# Patient Record
Sex: MALE | URBAN - METROPOLITAN AREA
[De-identification: names, ages, dates, MRNs, and addresses within clinical notes are randomized per-mention and may not be internally consistent; named-entity substitution may affect disease eponyms.]

---

## 2017-05-01 ENCOUNTER — OUTPATIENT (OUTPATIENT)
Dept: OUTPATIENT SERVICES | Facility: HOSPITAL | Age: 67
LOS: 1 days | End: 2017-05-01
Payer: MEDICAID

## 2017-05-23 DIAGNOSIS — R69 ILLNESS, UNSPECIFIED: ICD-10-CM

## 2017-06-01 PROCEDURE — G9001: CPT

## 2018-11-28 ENCOUNTER — HOSPITAL ENCOUNTER (INPATIENT)
Dept: HOSPITAL 74 - YASAS | Age: 68
LOS: 5 days | Discharge: HOME | DRG: 897 | End: 2018-12-03
Attending: INTERNAL MEDICINE | Admitting: INTERNAL MEDICINE
Payer: COMMERCIAL

## 2018-11-28 VITALS — BODY MASS INDEX: 25 KG/M2

## 2018-11-28 DIAGNOSIS — F17.213: ICD-10-CM

## 2018-11-28 DIAGNOSIS — R56.9: ICD-10-CM

## 2018-11-28 DIAGNOSIS — R73.9: ICD-10-CM

## 2018-11-28 DIAGNOSIS — F10.230: Primary | ICD-10-CM

## 2018-11-28 DIAGNOSIS — I10: ICD-10-CM

## 2018-11-28 DIAGNOSIS — D69.6: ICD-10-CM

## 2018-11-28 PROCEDURE — HZ2ZZZZ DETOXIFICATION SERVICES FOR SUBSTANCE ABUSE TREATMENT: ICD-10-PCS | Performed by: INTERNAL MEDICINE

## 2018-11-28 NOTE — HP
CIWA Score


Nausea/Vomiting: 3


Muscle Tremors: 4-Moderate,w/Arms Extend


Anxiety: 3


Agitation: 3


Paroxysmal Sweats: 2


Orientation: 1-Uncertain about Date


Tacttile Disturbances: 1-Very Mild Itch/Numbness


Auditory Disturbances: 0-None


Visual Disturbances: 0-None


Headache: 3-Moderate


CIWA-Ar Total Score: 20





- Admission Criteria


[facility] Guidelines: Admission for Medically Managed Detox: 


Requires at least one of the followin. CIWA greater than 12


2. Seizures within the past 24 hours


3. Delirium tremens within the past 24 hours


4. Hallucinations within the past 24 hours


5. Acute intervention needed for co  occurring medical disorder


6. Acute intervention needed for co  occurring psychiatric disorder


7. Severe withdrawal that cannot be handled at a lower level of care (continued


    vomiting, continued diarrhea, abnormal vital signs) requiring intravenous


    medication and/or fluids


8. Pregnancy








Admission ROS S





- HPI


Chief Complaint: 





Alcohol withdrawal symptoms


Allergies/Adverse Reactions: 


 Allergies











Allergy/AdvReac Type Severity Reaction Status Date / Time


 


No Known Allergies Allergy   Verified 18 21:41











History of Present Illness: 





67 years old male with a long history of alcohol dependence is seeking 

admission to detox. Patient has has been in previous detox at [facility]  and reports 9 years  of sobriety.  He has 

medical history of hypertension. He denies suicide attempt and suicidal 

ideation at this time.


Exam Limitations: No Limitations





- Ebola screening


Have you traveled outside of the country in the last 21 days: No


Have you had contact with anyone from an Ebola affected area: No


Have you been sick,other than usual withdrawal symptoms: No


Do you have a fever: No





- Review of Systems


Constitutional: Chills, Malaise, Night Sweats, Changes in sleep, Weakness


EENT: reports: Cataracts


Respiratory: reports: No Symptoms reported


Cardiac: reports: No Symptoms Reported


GI: reports: Constipated, Poor Appetite, Poor Fluid Intake, Vomiting, Abdominal 

cramping


: reports: No Symptoms Reported


Musculoskeletal: reports: Back Pain, Muscle Pain


Integumentary: reports: Dryness


Neuro: reports: Headache, Tingling, Tremors


Endocrine: reports: No Symptoms Reported


Hematology: reports: No Symptoms Reported


Psychiatric: reports: Mood/Affect Appropiate, Orientated x3


Other Systems: Reviewed and Negative





Patient History





- Patient Medical History


Hx Anemia: No


Hx Asthma: No


Hx Chronic Obstructive Pulmonary Disease (COPD): No


Hx Cancer: No


Hx Cardiac Disorders: No


Hx Congestive Heart Failure: No


Hx Hypertension: Yes (Metoprolol, Amlodipine)


Hx Hypercholesterolemia: No


Hx Pacemaker: No


HX Cerebrovascular Accident: No


Hx Seizures: Yes (Not on medication)


Hx Dementia: No


Hx Diabetes: No


Hx Gastrointestinal Disorders: No


Hx Liver Disease: No


Hx Genitourinary Disorders: No


Hx Sexually Transmitted Disorders: No


Hx Renal Disease (ESRD): No


Hx Thyroid Disease: No


Hx Human Immunodeficiency Virus (HIV): No


Hx Hepatitis C: No


Hx Depression: No


Hx Suicide Attempt: No (Denies suicidal ideation at this time)


Hx Bipolar Disorder: No


Hx Schizophrenia: No





- Patient Surgical History


Past Surgical History: No





- PPD History


Previous Implant?: No (Male)


Documented Results: Negative w/o proof


Implanted On Prior [facility] Admission?: No


PPD to be Administered?: Yes





- Reproductive History


Patient is a Female of Child Bearing Age (11 -55 yrs old): No (Male)





- Smoking Cessation


Smoking history: Current every day smoker


Have you smoked in the past 12 months: Yes


Aproximately how many cigarettes per day: 15


Cigars Per Day: 15


Hx Chewing Tobacco Use: Yes


Initiated information on smoking cessation: Yes


'Breaking Loose' booklet given: 18





- Substance & Tx. History


Hx Alcohol Use: Yes


Hx Substance Use: No


Substance Use Type: None


Hx Substance Use Treatment: No





- Substances Abused


  ** Alcohol


Route: Oral


Frequency: Daily


Amount used: RUM- 1FIFTH, BEER - 6 PACK


Age of first use: 10


Date of Last Use: 18





Family Disease History





- Family Disease History


Family Disease History: Heart Disease: Mother, Other: Son (Hweart attack - 

)





Admission Physical Exam [facility]





- Vital Signs


Vital Signs: 


 Vital Signs - 24 hr











  18





  20:36


 


Temperature 98.3 F


 


Pulse Rate 115 H


 


Respiratory 18





Rate 


 


Blood Pressure 160/90














- Physical


General Appearance: Yes: Appropriately Dressed


HEENTM: Yes: EOMI, Normal ENT Inspection, Normocephalic, Normal Voice, DAMIAN


Respiratory: Yes: Lungs Clear, Normal Breath Sounds, No Respiratory Distress


Neck: Yes: Supple


Breast: Yes: Breast Exam Deferred


Cardiology: Yes: Regular Rhythm, Regular Rate


Abdominal: Yes: Normal Bowel Sounds


Back: Yes: Normal Inspection


Musculoskeletal: Yes: Within Normal Limits


Extremities: Yes: Tremors, Pedal Edema, Swelling


Neurological: Yes: CNs II-XII NML intact, Motor Strength 5/5, Normal Mood/Affect

, Normal Response


Integumentary: Yes: Within Normal Limits


Lymphatic: Yes: Within Normal Limits





- Diagnostic


(1) Nicotine dependence


Current Visit: Yes   Status: Chronic   





(2) Alcohol dependence with uncomplicated withdrawal


Current Visit: Yes   Status: Chronic   





Cleared for Admission [facility]





- Detox or Rehab


[facility] Level of Care: Medically Managed


Detox Regimen/Protocol: Librium





[facility] Breath Alcohol Content


Breath Alcohol Content: 0.305





Urine Drug Screen





- Results


Drug Screen Negative: Yes

## 2018-11-29 LAB
ALBUMIN SERPL-MCNC: 3.4 G/DL (ref 3.4–5)
ALP SERPL-CCNC: 98 U/L (ref 45–117)
ALT SERPL-CCNC: 75 U/L (ref 13–61)
ANION GAP SERPL CALC-SCNC: 13 MMOL/L (ref 8–16)
AST SERPL-CCNC: 121 U/L (ref 15–37)
BILIRUB SERPL-MCNC: 0.8 MG/DL (ref 0.2–1)
BUN SERPL-MCNC: 8 MG/DL (ref 7–18)
CALCIUM SERPL-MCNC: 9 MG/DL (ref 8.5–10.1)
CHLORIDE SERPL-SCNC: 104 MMOL/L (ref 98–107)
CO2 SERPL-SCNC: 22 MMOL/L (ref 21–32)
CREAT SERPL-MCNC: 0.6 MG/DL (ref 0.55–1.3)
DEPRECATED RDW RBC AUTO: 13.8 % (ref 11.9–15.9)
GLUCOSE SERPL-MCNC: 131 MG/DL (ref 74–106)
HCT VFR BLD CALC: 44.6 % (ref 35.4–49)
HGB BLD-MCNC: 14.7 GM/DL (ref 11.7–16.9)
MCH RBC QN AUTO: 35.2 PG (ref 25.7–33.7)
MCHC RBC AUTO-ENTMCNC: 32.9 G/DL (ref 32–35.9)
MCV RBC: 106.9 FL (ref 80–96)
PLATELET # BLD AUTO: 113 K/MM3 (ref 134–434)
PMV BLD: 9.5 FL (ref 7.5–11.1)
POTASSIUM SERPLBLD-SCNC: 3.6 MMOL/L (ref 3.5–5.1)
PROT SERPL-MCNC: 7.9 G/DL (ref 6.4–8.2)
RBC # BLD AUTO: 4.17 M/MM3 (ref 4–5.6)
SODIUM SERPL-SCNC: 140 MMOL/L (ref 136–145)
WBC # BLD AUTO: 4.1 K/MM3 (ref 4–10)

## 2018-11-29 RX ADMIN — NICOTINE SCH MG: 14 PATCH, EXTENDED RELEASE TRANSDERMAL at 10:30

## 2018-11-29 RX ADMIN — AMLODIPINE BESYLATE SCH MG: 10 TABLET ORAL at 17:20

## 2018-11-29 RX ADMIN — Medication SCH MG: at 22:39

## 2018-11-29 RX ADMIN — LOSARTAN POTASSIUM SCH MG: 50 TABLET, FILM COATED ORAL at 10:30

## 2018-11-29 RX ADMIN — Medication PRN MG: at 00:24

## 2018-11-29 RX ADMIN — Medication SCH TAB: at 10:30

## 2018-11-29 RX ADMIN — Medication SCH: at 00:23

## 2018-11-29 NOTE — PN
[facility] CIWA





- CIWA Score


Nausea/Vomitin


Muscle Tremors: 4-Moderate,w/Arms Extend


Anxiety: 4-Mod. Anxious/Guarded


Agitation: 4-Moderately Restless


Paroxysmal Sweats: 3


Orientation: 0-Oriented


Tacttile Disturbances: 0-None


Auditory Disturbances: 0-None


Visual Disturbances: 0-None


Headache: 0-None Present


CIWA-Ar Total Score: 17





[facility] Progress Note (SOAP)


Subjective: 





Sweating, tremor, dizziness, anxious


Objective: 





18 10:44


 Last Vital Signs











Temp Pulse Resp BP Pulse Ox


 


 98.3 F   108 H  20   171/96 H   


 


 18 09:35  18 09:35  18 09:35  18 09:35   








Elevated b/p noted (h/o htn, takes med at home, will resume hypertensive 

medications)





Admission labs in progress


Assessment: 





18 10:45


Withdrawal symptoms


Plan: 





Continue detox


Encouraged PO water intake


Follow up on admission labs





HTN: resume metoprolol 25mg PO daily, losartan 50mg PO daily and norvasc 10mg 

PO daily (norvasc ordered for daily at 6pm)

## 2018-11-30 RX ADMIN — LOSARTAN POTASSIUM SCH MG: 50 TABLET, FILM COATED ORAL at 10:48

## 2018-11-30 RX ADMIN — NICOTINE SCH: 14 PATCH, EXTENDED RELEASE TRANSDERMAL at 10:48

## 2018-11-30 RX ADMIN — AMLODIPINE BESYLATE SCH MG: 10 TABLET ORAL at 18:38

## 2018-11-30 RX ADMIN — Medication SCH MG: at 22:23

## 2018-11-30 RX ADMIN — Medication SCH TAB: at 10:48

## 2018-11-30 NOTE — PN
[facility] CIWA





- CIWA Score


Nausea/Vomitin


Muscle Tremors: 3


Anxiety: 3


Agitation: 3


Paroxysmal Sweats: 3


Orientation: 0-Oriented


Tacttile Disturbances: 0-None


Auditory Disturbances: 0-None


Visual Disturbances: 0-None


Headache: 1-Very Mild


CIWA-Ar Total Score: 15





BHS Progress Note (SOAP)


Subjective: 





Interrupted sleep, restlessness, legs hurt, anxious


Objective: 





18 13:59


 Last Vital Signs











Temp Pulse Resp BP Pulse Ox


 


 97.6 F   84   16   152/86    


 


 18 10:14  18 10:14  18 10:14  18 10:14   








 Laboratory Tests











  18





  07:00 07:00 07:00


 


WBC  4.1  


 


RBC  4.17  


 


Hgb  14.7  


 


Hct  44.6  


 


MCV  106.9 H  


 


MCH  35.2 H  


 


MCHC  32.9  


 


RDW  13.8  


 


Plt Count  113 L  


 


MPV  9.5  


 


Sodium   140 


 


Potassium   3.6 


 


Chloride   104 


 


Carbon Dioxide   22 


 


Anion Gap   13 


 


BUN   8 


 


Creatinine   0.6 


 


Creat Clearance w eGFR   > 60 


 


Random Glucose   131 H 


 


Calcium   9.0 


 


Total Bilirubin   0.8 


 


AST   121 H 


 


ALT   75 H 


 


Alkaline Phosphatase   98 


 


Total Protein   7.9 


 


Albumin   3.4 


 


RPR Titer    Nonreactive








Labs reviewed: glucose 131, plt 113


Assessment: 





18 14:01


Withdrawal symptoms





Noted with hyperglycemia and thrombocytopenia


Plan: 





Continue detox





Hyperglycemia: encouraged PO water intake, repeat fasting glucose





Thrombocytopenia: asymptomatic, probably r/t alcohol dependence, follow up with 

PCP for monitoring

## 2018-12-01 LAB
APPEARANCE UR: CLEAR
BILIRUB UR STRIP.AUTO-MCNC: NEGATIVE MG/DL
COLOR UR: YELLOW
KETONES UR QL STRIP: NEGATIVE
LEUKOCYTE ESTERASE UR QL STRIP.AUTO: NEGATIVE
NITRITE UR QL STRIP: NEGATIVE
PH UR: 7 [PH] (ref 5–8)
PROT UR QL STRIP: NEGATIVE
PROT UR QL STRIP: NEGATIVE
SP GR UR: 1.01 (ref 1.01–1.03)
UROBILINOGEN UR STRIP-MCNC: (no result) MG/DL (ref 0.2–1)

## 2018-12-01 RX ADMIN — AMLODIPINE BESYLATE SCH MG: 10 TABLET ORAL at 17:14

## 2018-12-01 RX ADMIN — LOSARTAN POTASSIUM SCH MG: 50 TABLET, FILM COATED ORAL at 10:45

## 2018-12-01 RX ADMIN — Medication SCH TAB: at 10:44

## 2018-12-01 RX ADMIN — Medication SCH MG: at 22:23

## 2018-12-01 RX ADMIN — NICOTINE SCH MG: 14 PATCH, EXTENDED RELEASE TRANSDERMAL at 10:45

## 2018-12-01 RX ADMIN — Medication PRN MG: at 22:23

## 2018-12-01 NOTE — PN
[facility] Progress Note (SOAP)


Subjective: 





PT IS OOB AMBULATING BUT WITH SLIGHTLY UNSTABLE GAIT, TRACE OF SHAKES. ALERT O 

X 3. PT REPORTS HE FEELS LESS SHAKY NOW THAN WHEN HE FIRST CAME INTO DETOX.


Objective: 





12/01/18 12:41


 Vital Signs











  12/01/18 12/01/18





  06:33 09:49


 


Temperature 97.3 F L 97.5 F L


 


Pulse Rate 78 88


 


Respiratory 18 18





Rate  


 


Blood Pressure 143/88 144/92








 Laboratory Tests











  11/29/18 11/29/18 11/29/18





  07:00 07:00 07:00


 


WBC  4.1  


 


RBC  4.17  


 


Hgb  14.7  


 


Hct  44.6  


 


MCV  106.9 H  


 


MCH  35.2 H  


 


MCHC  32.9  


 


RDW  13.8  


 


Plt Count  113 L  


 


MPV  9.5  


 


Sodium   140 


 


Potassium   3.6 


 


Chloride   104 


 


Carbon Dioxide   22 


 


Anion Gap   13 


 


BUN   8 


 


Creatinine   0.6 


 


Creat Clearance w eGFR   > 60 


 


Random Glucose   131 H 


 


Calcium   9.0 


 


Total Bilirubin   0.8 


 


AST   121 H 


 


ALT   75 H 


 


Alkaline Phosphatase   98 


 


Total Protein   7.9 


 


Albumin   3.4 


 


RPR Titer    Nonreactive











Assessment: 





12/01/18 12:45


DECREASED WITHDRAWAL SX


Plan: 





CONTINUE DETOX/MONITOR FOR SAFETY.

## 2018-12-02 RX ADMIN — Medication SCH TAB: at 10:35

## 2018-12-02 RX ADMIN — NICOTINE SCH MG: 14 PATCH, EXTENDED RELEASE TRANSDERMAL at 10:35

## 2018-12-02 RX ADMIN — Medication SCH MG: at 22:15

## 2018-12-02 RX ADMIN — AMLODIPINE BESYLATE SCH MG: 10 TABLET ORAL at 17:25

## 2018-12-02 RX ADMIN — LOSARTAN POTASSIUM SCH MG: 50 TABLET, FILM COATED ORAL at 10:35

## 2018-12-02 NOTE — PN
BHS Progress Note (SOAP)


Subjective: 





Tremor, interrupted sleep


Objective: 





12/02/18 16:08


 Last Vital Signs











Temp Pulse Resp BP Pulse Ox


 


 97.2 F L  86   18   139/83    


 


 12/02/18 13:27  12/02/18 13:27  12/02/18 13:27  12/02/18 13:27   








 Laboratory Tests











  11/29/18 11/29/18 11/29/18





  07:00 07:00 07:00


 


WBC  4.1  


 


RBC  4.17  


 


Hgb  14.7  


 


Hct  44.6  


 


MCV  106.9 H  


 


MCH  35.2 H  


 


MCHC  32.9  


 


RDW  13.8  


 


Plt Count  113 L  


 


MPV  9.5  


 


Sodium   140 


 


Potassium   3.6 


 


Chloride   104 


 


Carbon Dioxide   22 


 


Anion Gap   13 


 


BUN   8 


 


Creatinine   0.6 


 


Creat Clearance w eGFR   > 60 


 


Random Glucose   131 H 


 


Calcium   9.0 


 


Total Bilirubin   0.8 


 


AST   121 H 


 


ALT   75 H 


 


Alkaline Phosphatase   98 


 


Total Protein   7.9 


 


Albumin   3.4 


 


Urine Color   


 


Urine Appearance   


 


Urine pH   


 


Ur Specific Gravity   


 


Urine Protein   


 


Urine Glucose (UA)   


 


Urine Ketones   


 


Urine Blood   


 


Urine Nitrite   


 


Urine Bilirubin   


 


Urine Urobilinogen   


 


Ur Leukocyte Esterase   


 


RPR Titer    Nonreactive














  12/01/18





  09:37


 


WBC 


 


RBC 


 


Hgb 


 


Hct 


 


MCV 


 


MCH 


 


MCHC 


 


RDW 


 


Plt Count 


 


MPV 


 


Sodium 


 


Potassium 


 


Chloride 


 


Carbon Dioxide 


 


Anion Gap 


 


BUN 


 


Creatinine 


 


Creat Clearance w eGFR 


 


Random Glucose 


 


Calcium 


 


Total Bilirubin 


 


AST 


 


ALT 


 


Alkaline Phosphatase 


 


Total Protein 


 


Albumin 


 


Urine Color  Yellow


 


Urine Appearance  Clear


 


Urine pH  7.0


 


Ur Specific [place]  1.011


 


Urine Protein  Negative


 


Urine Glucose (UA)  Negative


 


Urine Ketones  Negative


 


Urine Blood  Negative


 


Urine Nitrite  Negative


 


Urine Bilirubin  Negative


 


Urine Urobilinogen  4.0 e.u/dl


 


Ur Leukocyte Esterase  Negative


 


RPR Titer 








Labs reviewed: glucose 131


Assessment: 





12/02/18 16:08


Withdrawal symptoms





Hyperglycemia noted


Plan: 





Continue detox


Encouraged PO water intake





Hyperglycemia: repeat fasting glucose





Patient for discharge tomorrow and will need transportation. As per staff, 

patient not eligible for transportation via his insurance and patient lives in 

[place].

## 2018-12-03 VITALS — HEART RATE: 86 BPM | SYSTOLIC BLOOD PRESSURE: 135 MMHG | DIASTOLIC BLOOD PRESSURE: 83 MMHG | TEMPERATURE: 97.5 F

## 2018-12-03 RX ADMIN — LOSARTAN POTASSIUM SCH MG: 50 TABLET, FILM COATED ORAL at 10:29

## 2018-12-03 RX ADMIN — Medication SCH TAB: at 10:29

## 2018-12-03 RX ADMIN — NICOTINE SCH: 14 PATCH, EXTENDED RELEASE TRANSDERMAL at 10:30

## 2018-12-03 NOTE — DS
[facility] Detox Discharge Summary


Admission Date: 


11/28/18





Discharge Date: 12/03/18





- History


Additional Comments: 





pt being discharged home today.


Presently still on the unit awaiting transportation back home


Pt will do aftercare by attending AA meetings.


States he has a good support person that lives 4 houses from him who he intends 

to be constantly liaising with as a supplemental help to his sobriety.


Declined med refill, states "I still have a lot at home"





 Vital Signs











Temperature  97.5 F L  12/03/18 09:30


 


Pulse Rate  86   12/03/18 09:30


 


Respiratory Rate  18   12/03/18 09:30


 


Blood Pressure  135/83   12/03/18 09:30


 


O2 Sat by Pulse Oximetry (%)      














Pertinent Past History: 





HTN


Hyperglycemia


Thrombocytopenia


Seizure





- Physical Exam Results


Vital Signs: 


 Vital Signs











Temperature  97.5 F L  12/03/18 09:30


 


Pulse Rate  86   12/03/18 09:30


 


Respiratory Rate  18   12/03/18 09:30


 


Blood Pressure  135/83   12/03/18 09:30


 


O2 Sat by Pulse Oximetry (%)      











Pertinent Admission Physical Exam Findings: 





withdrawal sx





- Treatment


Hospital Course: Detox Protocol Followed, Detoxed Safely, Responded well, 

Discharged Condition Good


Patient has Accepted a Rehab Referral to: AA meetings





- Medication


Discharge Medications: 


Ambulatory Orders





Amlodipine Besylate 10 mg PO DAILY 11/28/18 


Losartan Potassium 50 mg PO DAILY 11/28/18 


Metoprolol Succinate 25 mg PO DAILY 11/28/18 











- Diagnosis


(1) Alcohol dependence with uncomplicated withdrawal


Current Visit: Yes   Status: Acute   





(2) Hyperglycemia


Current Visit: Yes   Status: Acute   





(3) Hypertension


Current Visit: Yes   Status: Chronic   


Qualifiers: 


   Hypertension type: essential hypertension   Qualified Code(s): I10 - 

Essential (primary) hypertension   





(4) Nicotine dependence


Current Visit: Yes   Status: Chronic   


Qualifiers: 


   Nicotine product type: cigarettes   Substance use status: in withdrawal   

Qualified Code(s): F17.213 - Nicotine dependence, cigarettes, with withdrawal   





(5) Thrombocytopenia


Current Visit: Yes   Status: Chronic   





(6) Seizure


Current Visit: Yes   Status: Suspected

## 2018-12-03 NOTE — PN
BHS Progress Note (SOAP)


Subjective: 





denies any complaints


Objective: 





12/03/18 11:20


 A & O x 3


able to ambulate





 Vital Signs











Temperature  97.5 F L  12/03/18 09:30


 


Pulse Rate  86   12/03/18 09:30


 


Respiratory Rate  18   12/03/18 09:30


 


Blood Pressure  135/83   12/03/18 09:30


 


O2 Sat by Pulse Oximetry (%)      











Assessment: 





12/03/18 11:20


detox completed


Plan: 





for discharge home

## 2021-11-23 ENCOUNTER — HOSPITAL ENCOUNTER (INPATIENT)
Dept: HOSPITAL 74 - YASAS | Age: 71
LOS: 5 days | Discharge: HOME | DRG: 897 | End: 2021-11-28
Attending: ALLERGY & IMMUNOLOGY | Admitting: ALLERGY & IMMUNOLOGY
Payer: COMMERCIAL

## 2021-11-23 VITALS — BODY MASS INDEX: 24.7 KG/M2

## 2021-11-23 DIAGNOSIS — Z86.69: ICD-10-CM

## 2021-11-23 DIAGNOSIS — Z56.0: ICD-10-CM

## 2021-11-23 DIAGNOSIS — E11.65: ICD-10-CM

## 2021-11-23 DIAGNOSIS — F17.213: ICD-10-CM

## 2021-11-23 DIAGNOSIS — E78.5: ICD-10-CM

## 2021-11-23 DIAGNOSIS — I10: ICD-10-CM

## 2021-11-23 DIAGNOSIS — F10.230: Primary | ICD-10-CM

## 2021-11-23 PROCEDURE — HZ2ZZZZ DETOXIFICATION SERVICES FOR SUBSTANCE ABUSE TREATMENT: ICD-10-PCS | Performed by: ALLERGY & IMMUNOLOGY

## 2021-11-23 PROCEDURE — U0003 INFECTIOUS AGENT DETECTION BY NUCLEIC ACID (DNA OR RNA); SEVERE ACUTE RESPIRATORY SYNDROME CORONAVIRUS 2 (SARS-COV-2) (CORONAVIRUS DISEASE [COVID-19]), AMPLIFIED PROBE TECHNIQUE, MAKING USE OF HIGH THROUGHPUT TECHNOLOGIES AS DESCRIBED BY CMS-2020-01-R: HCPCS

## 2021-11-23 PROCEDURE — C9803 HOPD COVID-19 SPEC COLLECT: HCPCS

## 2021-11-23 PROCEDURE — U0005 INFEC AGEN DETEC AMPLI PROBE: HCPCS

## 2021-11-23 RX ADMIN — Medication SCH MG: at 23:05

## 2021-11-23 RX ADMIN — HYDROXYZINE PAMOATE SCH MG: 25 CAPSULE ORAL at 23:05

## 2021-11-23 SDOH — ECONOMIC STABILITY - INCOME SECURITY: UNEMPLOYMENT, UNSPECIFIED: Z56.0

## 2021-11-24 LAB
ALBUMIN SERPL-MCNC: 3.3 G/DL (ref 3.4–5)
ALP SERPL-CCNC: 80 U/L (ref 45–117)
ALT SERPL-CCNC: 37 U/L (ref 13–61)
ANION GAP SERPL CALC-SCNC: 7 MMOL/L (ref 8–16)
AST SERPL-CCNC: 30 U/L (ref 15–37)
BILIRUB SERPL-MCNC: 1 MG/DL (ref 0.2–1)
BUN SERPL-MCNC: 15.3 MG/DL (ref 7–18)
CALCIUM SERPL-MCNC: 9.5 MG/DL (ref 8.5–10.1)
CHLORIDE SERPL-SCNC: 100 MMOL/L (ref 98–107)
CO2 SERPL-SCNC: 30 MMOL/L (ref 21–32)
CREAT SERPL-MCNC: 0.6 MG/DL (ref 0.55–1.3)
DEPRECATED RDW RBC AUTO: 15.2 % (ref 11.9–15.9)
GLUCOSE SERPL-MCNC: 147 MG/DL (ref 74–106)
HCT VFR BLD CALC: 42.8 % (ref 35.4–49)
HGB BLD-MCNC: 14.7 GM/DL (ref 11.7–16.9)
MCH RBC QN AUTO: 33 PG (ref 25.7–33.7)
MCHC RBC AUTO-ENTMCNC: 34.4 G/DL (ref 32–35.9)
MCV RBC: 95.7 FL (ref 80–96)
PLATELET # BLD AUTO: 71 10^3/UL (ref 134–434)
PMV BLD: 8.5 FL (ref 7.5–11.1)
PROT SERPL-MCNC: 7.3 G/DL (ref 6.4–8.2)
RBC # BLD AUTO: 4.47 M/MM3 (ref 4–5.6)
SODIUM SERPL-SCNC: 137 MMOL/L (ref 136–145)
WBC # BLD AUTO: 4.3 K/MM3 (ref 4–10)

## 2021-11-24 RX ADMIN — NICOTINE SCH MG: 21 PATCH TRANSDERMAL at 10:16

## 2021-11-24 RX ADMIN — HYDROXYZINE PAMOATE SCH: 25 CAPSULE ORAL at 13:30

## 2021-11-24 RX ADMIN — HYDROXYZINE PAMOATE SCH MG: 25 CAPSULE ORAL at 05:34

## 2021-11-24 RX ADMIN — INSULIN ASPART SCH UNITS: 100 INJECTION, SOLUTION INTRAVENOUS; SUBCUTANEOUS at 06:06

## 2021-11-24 RX ADMIN — LOSARTAN POTASSIUM SCH MG: 50 TABLET, FILM COATED ORAL at 10:15

## 2021-11-24 RX ADMIN — Medication SCH MG: at 22:21

## 2021-11-24 RX ADMIN — Medication SCH TAB: at 10:15

## 2021-11-24 RX ADMIN — HYDROXYZINE PAMOATE SCH MG: 25 CAPSULE ORAL at 18:09

## 2021-11-24 RX ADMIN — INSULIN ASPART SCH UNITS: 100 INJECTION, SOLUTION INTRAVENOUS; SUBCUTANEOUS at 18:11

## 2021-11-24 RX ADMIN — METHOCARBAMOL PRN MG: 500 TABLET ORAL at 05:35

## 2021-11-24 RX ADMIN — Medication SCH MG: at 22:20

## 2021-11-24 RX ADMIN — HYDROXYZINE PAMOATE SCH MG: 25 CAPSULE ORAL at 10:15

## 2021-11-24 RX ADMIN — HYDROXYZINE PAMOATE SCH MG: 25 CAPSULE ORAL at 22:20

## 2021-11-24 RX ADMIN — AMLODIPINE BESYLATE SCH MG: 10 TABLET ORAL at 10:15

## 2021-11-24 RX ADMIN — INSULIN ASPART SCH UNITS: 100 INJECTION, SOLUTION INTRAVENOUS; SUBCUTANEOUS at 11:44

## 2021-11-25 RX ADMIN — HYDROXYZINE PAMOATE SCH: 25 CAPSULE ORAL at 13:22

## 2021-11-25 RX ADMIN — HYDROXYZINE PAMOATE SCH MG: 25 CAPSULE ORAL at 05:14

## 2021-11-25 RX ADMIN — INSULIN ASPART SCH UNITS: 100 INJECTION, SOLUTION INTRAVENOUS; SUBCUTANEOUS at 11:44

## 2021-11-25 RX ADMIN — LOSARTAN POTASSIUM SCH MG: 50 TABLET, FILM COATED ORAL at 10:11

## 2021-11-25 RX ADMIN — AMLODIPINE BESYLATE SCH MG: 10 TABLET ORAL at 10:12

## 2021-11-25 RX ADMIN — POTASSIUM CHLORIDE SCH MEQ: 20 SOLUTION ORAL at 21:47

## 2021-11-25 RX ADMIN — INSULIN ASPART SCH: 100 INJECTION, SOLUTION INTRAVENOUS; SUBCUTANEOUS at 16:59

## 2021-11-25 RX ADMIN — POTASSIUM CHLORIDE SCH MEQ: 20 SOLUTION ORAL at 10:57

## 2021-11-25 RX ADMIN — Medication SCH TAB: at 10:12

## 2021-11-25 RX ADMIN — INSULIN DETEMIR SCH UNITS: 100 INJECTION, SOLUTION SUBCUTANEOUS at 21:50

## 2021-11-25 RX ADMIN — Medication SCH MG: at 21:47

## 2021-11-25 RX ADMIN — HYDROXYZINE PAMOATE SCH MG: 25 CAPSULE ORAL at 10:12

## 2021-11-25 RX ADMIN — INSULIN ASPART SCH UNITS: 100 INJECTION, SOLUTION INTRAVENOUS; SUBCUTANEOUS at 06:05

## 2021-11-25 RX ADMIN — NICOTINE SCH MG: 21 PATCH TRANSDERMAL at 10:12

## 2021-11-25 RX ADMIN — HYDROXYZINE PAMOATE SCH: 25 CAPSULE ORAL at 16:59

## 2021-11-25 RX ADMIN — INSULIN ASPART SCH UNITS: 100 INJECTION, SOLUTION INTRAVENOUS; SUBCUTANEOUS at 21:49

## 2021-11-25 RX ADMIN — METHOCARBAMOL PRN MG: 500 TABLET ORAL at 05:14

## 2021-11-25 RX ADMIN — HYDROXYZINE PAMOATE SCH: 25 CAPSULE ORAL at 21:51

## 2021-11-26 RX ADMIN — Medication SCH MG: at 22:01

## 2021-11-26 RX ADMIN — INSULIN DETEMIR SCH UNITS: 100 INJECTION, SOLUTION SUBCUTANEOUS at 22:02

## 2021-11-26 RX ADMIN — INSULIN ASPART SCH UNITS: 100 INJECTION, SOLUTION INTRAVENOUS; SUBCUTANEOUS at 06:30

## 2021-11-26 RX ADMIN — AMLODIPINE BESYLATE SCH MG: 10 TABLET ORAL at 10:23

## 2021-11-26 RX ADMIN — HYDROXYZINE PAMOATE SCH: 25 CAPSULE ORAL at 14:52

## 2021-11-26 RX ADMIN — NICOTINE SCH MG: 21 PATCH TRANSDERMAL at 10:22

## 2021-11-26 RX ADMIN — Medication SCH TAB: at 10:23

## 2021-11-26 RX ADMIN — INSULIN ASPART SCH UNITS: 100 INJECTION, SOLUTION INTRAVENOUS; SUBCUTANEOUS at 11:36

## 2021-11-26 RX ADMIN — POTASSIUM CHLORIDE SCH MEQ: 20 SOLUTION ORAL at 22:01

## 2021-11-26 RX ADMIN — HYDROXYZINE PAMOATE SCH MG: 25 CAPSULE ORAL at 22:01

## 2021-11-26 RX ADMIN — LOSARTAN POTASSIUM SCH MG: 50 TABLET, FILM COATED ORAL at 10:23

## 2021-11-26 RX ADMIN — HYDROXYZINE PAMOATE SCH MG: 25 CAPSULE ORAL at 18:02

## 2021-11-26 RX ADMIN — HYDROXYZINE PAMOATE SCH MG: 25 CAPSULE ORAL at 10:23

## 2021-11-26 RX ADMIN — INSULIN ASPART SCH UNITS: 100 INJECTION, SOLUTION INTRAVENOUS; SUBCUTANEOUS at 22:01

## 2021-11-26 RX ADMIN — HYDROXYZINE PAMOATE SCH MG: 25 CAPSULE ORAL at 06:23

## 2021-11-26 RX ADMIN — INSULIN ASPART SCH UNITS: 100 INJECTION, SOLUTION INTRAVENOUS; SUBCUTANEOUS at 17:04

## 2021-11-26 RX ADMIN — POTASSIUM CHLORIDE SCH MEQ: 20 SOLUTION ORAL at 10:22

## 2021-11-27 RX ADMIN — Medication SCH MG: at 22:03

## 2021-11-27 RX ADMIN — INSULIN ASPART SCH UNITS: 100 INJECTION, SOLUTION INTRAVENOUS; SUBCUTANEOUS at 22:03

## 2021-11-27 RX ADMIN — Medication SCH MG: at 22:04

## 2021-11-27 RX ADMIN — INSULIN ASPART SCH UNITS: 100 INJECTION, SOLUTION INTRAVENOUS; SUBCUTANEOUS at 17:24

## 2021-11-27 RX ADMIN — INSULIN DETEMIR SCH UNITS: 100 INJECTION, SOLUTION SUBCUTANEOUS at 22:03

## 2021-11-27 RX ADMIN — HYDROXYZINE PAMOATE SCH MG: 25 CAPSULE ORAL at 06:36

## 2021-11-27 RX ADMIN — HYDROXYZINE PAMOATE SCH: 25 CAPSULE ORAL at 22:04

## 2021-11-27 RX ADMIN — Medication SCH TAB: at 10:23

## 2021-11-27 RX ADMIN — HYDROXYZINE PAMOATE SCH: 25 CAPSULE ORAL at 17:26

## 2021-11-27 RX ADMIN — HYDROXYZINE PAMOATE SCH MG: 25 CAPSULE ORAL at 10:23

## 2021-11-27 RX ADMIN — INSULIN ASPART SCH UNITS: 100 INJECTION, SOLUTION INTRAVENOUS; SUBCUTANEOUS at 06:45

## 2021-11-27 RX ADMIN — HYDROXYZINE PAMOATE SCH: 25 CAPSULE ORAL at 13:50

## 2021-11-27 RX ADMIN — INSULIN ASPART SCH UNITS: 100 INJECTION, SOLUTION INTRAVENOUS; SUBCUTANEOUS at 11:10

## 2021-11-27 RX ADMIN — LOSARTAN POTASSIUM SCH MG: 50 TABLET, FILM COATED ORAL at 10:23

## 2021-11-27 RX ADMIN — NICOTINE SCH MG: 21 PATCH TRANSDERMAL at 10:24

## 2021-11-27 RX ADMIN — AMLODIPINE BESYLATE SCH MG: 10 TABLET ORAL at 10:23

## 2021-11-28 VITALS — DIASTOLIC BLOOD PRESSURE: 78 MMHG | HEART RATE: 92 BPM | SYSTOLIC BLOOD PRESSURE: 149 MMHG

## 2021-11-28 VITALS — TEMPERATURE: 97.1 F

## 2021-11-28 RX ADMIN — LOSARTAN POTASSIUM SCH MG: 50 TABLET, FILM COATED ORAL at 09:41

## 2021-11-28 RX ADMIN — HYDROXYZINE PAMOATE SCH MG: 25 CAPSULE ORAL at 05:19

## 2021-11-28 RX ADMIN — AMLODIPINE BESYLATE SCH MG: 10 TABLET ORAL at 09:41

## 2021-11-28 RX ADMIN — HYDROXYZINE PAMOATE SCH MG: 25 CAPSULE ORAL at 09:41

## 2021-11-28 RX ADMIN — Medication SCH TAB: at 09:41

## 2021-11-28 RX ADMIN — NICOTINE SCH: 21 PATCH TRANSDERMAL at 11:28

## 2021-11-28 RX ADMIN — INSULIN ASPART SCH UNITS: 100 INJECTION, SOLUTION INTRAVENOUS; SUBCUTANEOUS at 06:34

## 2021-11-28 RX ADMIN — INSULIN ASPART SCH UNITS: 100 INJECTION, SOLUTION INTRAVENOUS; SUBCUTANEOUS at 06:31

## 2022-07-16 ENCOUNTER — HOSPITAL ENCOUNTER (INPATIENT)
Dept: HOSPITAL 74 - YASAS | Age: 72
LOS: 13 days | Discharge: HOME | DRG: 895 | End: 2022-07-29
Attending: PSYCHIATRY & NEUROLOGY | Admitting: ALLERGY & IMMUNOLOGY
Payer: COMMERCIAL

## 2022-07-16 VITALS — BODY MASS INDEX: 25.6 KG/M2

## 2022-07-16 DIAGNOSIS — G40.909: ICD-10-CM

## 2022-07-16 DIAGNOSIS — Z79.4: ICD-10-CM

## 2022-07-16 DIAGNOSIS — F10.20: Primary | ICD-10-CM

## 2022-07-16 DIAGNOSIS — I10: ICD-10-CM

## 2022-07-16 DIAGNOSIS — R00.1: ICD-10-CM

## 2022-07-16 DIAGNOSIS — Z79.84: ICD-10-CM

## 2022-07-16 DIAGNOSIS — E11.9: ICD-10-CM

## 2022-07-16 DIAGNOSIS — E78.5: ICD-10-CM

## 2022-07-16 DIAGNOSIS — F17.210: ICD-10-CM

## 2022-07-16 PROCEDURE — HZ42ZZZ GROUP COUNSELING FOR SUBSTANCE ABUSE TREATMENT, COGNITIVE-BEHAVIORAL: ICD-10-PCS | Performed by: PSYCHIATRY & NEUROLOGY

## 2022-07-16 PROCEDURE — U0003 INFECTIOUS AGENT DETECTION BY NUCLEIC ACID (DNA OR RNA); SEVERE ACUTE RESPIRATORY SYNDROME CORONAVIRUS 2 (SARS-COV-2) (CORONAVIRUS DISEASE [COVID-19]), AMPLIFIED PROBE TECHNIQUE, MAKING USE OF HIGH THROUGHPUT TECHNOLOGIES AS DESCRIBED BY CMS-2020-01-R: HCPCS

## 2022-07-16 PROCEDURE — U0005 INFEC AGEN DETEC AMPLI PROBE: HCPCS

## 2022-07-16 RX ADMIN — INSULIN DETEMIR SCH UNITS: 100 INJECTION, SOLUTION SUBCUTANEOUS at 21:25

## 2022-07-16 RX ADMIN — Medication SCH MG: at 21:22

## 2022-07-16 RX ADMIN — ATORVASTATIN CALCIUM SCH MG: 10 TABLET, FILM COATED ORAL at 21:23

## 2022-07-16 RX ADMIN — LEVETIRACETAM SCH MG: 500 TABLET, FILM COATED ORAL at 21:23

## 2022-07-16 RX ADMIN — INSULIN ASPART SCH UNITS: 100 INJECTION, SOLUTION INTRAVENOUS; SUBCUTANEOUS at 12:05

## 2022-07-16 RX ADMIN — NICOTINE SCH MG: 14 PATCH, EXTENDED RELEASE TRANSDERMAL at 10:24

## 2022-07-16 RX ADMIN — INSULIN ASPART SCH UNITS: 100 INJECTION, SOLUTION INTRAVENOUS; SUBCUTANEOUS at 17:00

## 2022-07-16 RX ADMIN — METFORMIN HYDROCHLORIDE SCH MG: 500 TABLET ORAL at 16:59

## 2022-07-16 RX ADMIN — Medication SCH TAB: at 10:23

## 2022-07-16 RX ADMIN — NICOTINE PRN MG: 4 INHALANT RESPIRATORY (INHALATION) at 10:24

## 2022-07-16 RX ADMIN — INSULIN ASPART SCH UNITS: 100 INJECTION, SOLUTION INTRAVENOUS; SUBCUTANEOUS at 09:01

## 2022-07-17 LAB
ALBUMIN SERPL-MCNC: 3.9 G/DL (ref 3.4–5)
ALP SERPL-CCNC: 85 U/L (ref 45–117)
ALT SERPL-CCNC: 115 U/L (ref 13–61)
ANION GAP SERPL CALC-SCNC: 9 MMOL/L (ref 8–16)
AST SERPL-CCNC: 66 U/L (ref 15–37)
BILIRUB SERPL-MCNC: 0.7 MG/DL (ref 0.2–1)
BUN SERPL-MCNC: 10.2 MG/DL (ref 7–18)
CALCIUM SERPL-MCNC: 9.9 MG/DL (ref 8.5–10.1)
CHLORIDE SERPL-SCNC: 102 MMOL/L (ref 98–107)
CO2 SERPL-SCNC: 27 MMOL/L (ref 21–32)
CREAT SERPL-MCNC: 0.7 MG/DL (ref 0.55–1.3)
DEPRECATED RDW RBC AUTO: 16 % (ref 11.9–15.9)
GLUCOSE SERPL-MCNC: 138 MG/DL (ref 74–106)
HCT VFR BLD CALC: 46.2 % (ref 35.4–49)
HGB BLD-MCNC: 15.7 GM/DL (ref 11.7–16.9)
MCH RBC QN AUTO: 33.8 PG (ref 25.7–33.7)
MCHC RBC AUTO-ENTMCNC: 34 G/DL (ref 32–35.9)
MCV RBC: 99.2 FL (ref 80–96)
PLATELET # BLD AUTO: 141 10^3/UL (ref 134–434)
PMV BLD: 9.2 FL (ref 7.5–11.1)
PROT SERPL-MCNC: 8.2 G/DL (ref 6.4–8.2)
RBC # BLD AUTO: 4.66 M/MM3 (ref 4–5.6)
SODIUM SERPL-SCNC: 138 MMOL/L (ref 136–145)
WBC # BLD AUTO: 5.8 K/MM3 (ref 4–10)

## 2022-07-17 RX ADMIN — INSULIN DETEMIR SCH UNITS: 100 INJECTION, SOLUTION SUBCUTANEOUS at 21:29

## 2022-07-17 RX ADMIN — AMLODIPINE BESYLATE SCH MG: 10 TABLET ORAL at 09:48

## 2022-07-17 RX ADMIN — INSULIN ASPART SCH: 100 INJECTION, SOLUTION INTRAVENOUS; SUBCUTANEOUS at 16:34

## 2022-07-17 RX ADMIN — LEVETIRACETAM SCH MG: 500 TABLET, FILM COATED ORAL at 21:28

## 2022-07-17 RX ADMIN — LEVETIRACETAM SCH MG: 500 TABLET, FILM COATED ORAL at 09:48

## 2022-07-17 RX ADMIN — Medication SCH MG: at 21:27

## 2022-07-17 RX ADMIN — ATORVASTATIN CALCIUM SCH MG: 10 TABLET, FILM COATED ORAL at 21:28

## 2022-07-17 RX ADMIN — METFORMIN HYDROCHLORIDE SCH MG: 500 TABLET ORAL at 16:34

## 2022-07-17 RX ADMIN — Medication SCH TAB: at 09:48

## 2022-07-17 RX ADMIN — INSULIN ASPART SCH UNITS: 100 INJECTION, SOLUTION INTRAVENOUS; SUBCUTANEOUS at 11:50

## 2022-07-17 RX ADMIN — NICOTINE SCH MG: 14 PATCH, EXTENDED RELEASE TRANSDERMAL at 09:49

## 2022-07-17 RX ADMIN — METFORMIN HYDROCHLORIDE SCH MG: 500 TABLET ORAL at 06:44

## 2022-07-17 RX ADMIN — INSULIN ASPART SCH: 100 INJECTION, SOLUTION INTRAVENOUS; SUBCUTANEOUS at 06:44

## 2022-07-18 LAB
APPEARANCE UR: CLEAR
BILIRUB UR STRIP.AUTO-MCNC: NEGATIVE MG/DL
COLOR UR: YELLOW
KETONES UR QL STRIP: NEGATIVE
LEUKOCYTE ESTERASE UR QL STRIP.AUTO: NEGATIVE
NITRITE UR QL STRIP: NEGATIVE
PH UR: 6 [PH] (ref 5–8)
PROT UR QL STRIP: NEGATIVE
PROT UR QL STRIP: NEGATIVE
SP GR UR: 1.01 (ref 1.01–1.03)
TREPONEMA PALLIDUM AB [UNITS/VOLUME] IN SERUM OR PLASMA BY IMMUNOASSAY: (no result)
UROBILINOGEN UR STRIP-MCNC: 0.2 MG/DL (ref 0.2–1)

## 2022-07-18 RX ADMIN — METFORMIN HYDROCHLORIDE SCH MG: 500 TABLET ORAL at 06:57

## 2022-07-18 RX ADMIN — METFORMIN HYDROCHLORIDE SCH MG: 500 TABLET ORAL at 17:05

## 2022-07-18 RX ADMIN — Medication SCH TAB: at 10:03

## 2022-07-18 RX ADMIN — INSULIN ASPART SCH: 100 INJECTION, SOLUTION INTRAVENOUS; SUBCUTANEOUS at 11:59

## 2022-07-18 RX ADMIN — NICOTINE SCH MG: 14 PATCH, EXTENDED RELEASE TRANSDERMAL at 10:04

## 2022-07-18 RX ADMIN — INSULIN ASPART SCH: 100 INJECTION, SOLUTION INTRAVENOUS; SUBCUTANEOUS at 06:58

## 2022-07-18 RX ADMIN — INSULIN DETEMIR SCH UNITS: 100 INJECTION, SOLUTION SUBCUTANEOUS at 21:47

## 2022-07-18 RX ADMIN — INSULIN ASPART SCH UNITS: 100 INJECTION, SOLUTION INTRAVENOUS; SUBCUTANEOUS at 17:03

## 2022-07-18 RX ADMIN — Medication SCH: at 21:49

## 2022-07-18 RX ADMIN — AMLODIPINE BESYLATE SCH MG: 10 TABLET ORAL at 10:03

## 2022-07-18 RX ADMIN — ATORVASTATIN CALCIUM SCH MG: 10 TABLET, FILM COATED ORAL at 21:48

## 2022-07-18 RX ADMIN — Medication SCH MG: at 21:49

## 2022-07-18 RX ADMIN — LEVETIRACETAM SCH MG: 500 TABLET, FILM COATED ORAL at 21:49

## 2022-07-18 RX ADMIN — LEVETIRACETAM SCH MG: 500 TABLET, FILM COATED ORAL at 10:03

## 2022-07-19 RX ADMIN — METFORMIN HYDROCHLORIDE SCH MG: 500 TABLET ORAL at 06:33

## 2022-07-19 RX ADMIN — INSULIN ASPART SCH: 100 INJECTION, SOLUTION INTRAVENOUS; SUBCUTANEOUS at 17:03

## 2022-07-19 RX ADMIN — LEVETIRACETAM SCH MG: 500 TABLET, FILM COATED ORAL at 09:45

## 2022-07-19 RX ADMIN — Medication SCH MG: at 21:02

## 2022-07-19 RX ADMIN — AMLODIPINE BESYLATE SCH MG: 10 TABLET ORAL at 09:50

## 2022-07-19 RX ADMIN — INSULIN ASPART SCH: 100 INJECTION, SOLUTION INTRAVENOUS; SUBCUTANEOUS at 06:34

## 2022-07-19 RX ADMIN — NICOTINE PRN MG: 4 INHALANT RESPIRATORY (INHALATION) at 09:52

## 2022-07-19 RX ADMIN — Medication SCH TAB: at 09:50

## 2022-07-19 RX ADMIN — ATORVASTATIN CALCIUM SCH MG: 10 TABLET, FILM COATED ORAL at 21:02

## 2022-07-19 RX ADMIN — METFORMIN HYDROCHLORIDE SCH MG: 500 TABLET ORAL at 17:02

## 2022-07-19 RX ADMIN — INSULIN ASPART SCH UNITS: 100 INJECTION, SOLUTION INTRAVENOUS; SUBCUTANEOUS at 12:02

## 2022-07-19 RX ADMIN — INSULIN DETEMIR SCH UNITS: 100 INJECTION, SOLUTION SUBCUTANEOUS at 22:12

## 2022-07-19 RX ADMIN — LEVETIRACETAM SCH MG: 500 TABLET, FILM COATED ORAL at 21:02

## 2022-07-19 RX ADMIN — NICOTINE SCH MG: 14 PATCH, EXTENDED RELEASE TRANSDERMAL at 09:46

## 2022-07-20 RX ADMIN — INSULIN ASPART SCH: 100 INJECTION, SOLUTION INTRAVENOUS; SUBCUTANEOUS at 06:33

## 2022-07-20 RX ADMIN — ATORVASTATIN CALCIUM SCH MG: 10 TABLET, FILM COATED ORAL at 21:12

## 2022-07-20 RX ADMIN — INSULIN ASPART SCH UNITS: 100 INJECTION, SOLUTION INTRAVENOUS; SUBCUTANEOUS at 16:42

## 2022-07-20 RX ADMIN — Medication SCH MG: at 21:12

## 2022-07-20 RX ADMIN — LEVETIRACETAM SCH MG: 500 TABLET, FILM COATED ORAL at 10:04

## 2022-07-20 RX ADMIN — Medication SCH TAB: at 10:04

## 2022-07-20 RX ADMIN — METFORMIN HYDROCHLORIDE SCH MG: 500 TABLET ORAL at 06:31

## 2022-07-20 RX ADMIN — INSULIN DETEMIR SCH UNITS: 100 INJECTION, SOLUTION SUBCUTANEOUS at 21:11

## 2022-07-20 RX ADMIN — AMLODIPINE BESYLATE SCH MG: 10 TABLET ORAL at 10:04

## 2022-07-20 RX ADMIN — NICOTINE SCH MG: 14 PATCH, EXTENDED RELEASE TRANSDERMAL at 10:04

## 2022-07-20 RX ADMIN — Medication SCH MG: at 21:35

## 2022-07-20 RX ADMIN — METFORMIN HYDROCHLORIDE SCH MG: 500 TABLET ORAL at 16:41

## 2022-07-20 RX ADMIN — LEVETIRACETAM SCH MG: 500 TABLET, FILM COATED ORAL at 21:12

## 2022-07-20 RX ADMIN — INSULIN ASPART SCH: 100 INJECTION, SOLUTION INTRAVENOUS; SUBCUTANEOUS at 12:02

## 2022-07-21 RX ADMIN — AMLODIPINE BESYLATE SCH MG: 10 TABLET ORAL at 09:53

## 2022-07-21 RX ADMIN — INSULIN ASPART SCH: 100 INJECTION, SOLUTION INTRAVENOUS; SUBCUTANEOUS at 06:38

## 2022-07-21 RX ADMIN — Medication SCH MG: at 21:08

## 2022-07-21 RX ADMIN — NICOTINE SCH MG: 14 PATCH, EXTENDED RELEASE TRANSDERMAL at 09:52

## 2022-07-21 RX ADMIN — INSULIN DETEMIR SCH UNITS: 100 INJECTION, SOLUTION SUBCUTANEOUS at 21:09

## 2022-07-21 RX ADMIN — LEVETIRACETAM SCH MG: 500 TABLET, FILM COATED ORAL at 09:52

## 2022-07-21 RX ADMIN — ATORVASTATIN CALCIUM SCH MG: 10 TABLET, FILM COATED ORAL at 21:08

## 2022-07-21 RX ADMIN — INSULIN ASPART SCH: 100 INJECTION, SOLUTION INTRAVENOUS; SUBCUTANEOUS at 16:53

## 2022-07-21 RX ADMIN — METFORMIN HYDROCHLORIDE SCH MG: 500 TABLET ORAL at 06:36

## 2022-07-21 RX ADMIN — Medication SCH TAB: at 09:53

## 2022-07-21 RX ADMIN — LEVETIRACETAM SCH MG: 500 TABLET, FILM COATED ORAL at 21:08

## 2022-07-21 RX ADMIN — METFORMIN HYDROCHLORIDE SCH MG: 500 TABLET ORAL at 16:54

## 2022-07-21 RX ADMIN — INSULIN ASPART SCH: 100 INJECTION, SOLUTION INTRAVENOUS; SUBCUTANEOUS at 11:59

## 2022-07-22 RX ADMIN — METFORMIN HYDROCHLORIDE SCH MG: 500 TABLET ORAL at 16:30

## 2022-07-22 RX ADMIN — Medication SCH MG: at 21:19

## 2022-07-22 RX ADMIN — NICOTINE SCH MG: 14 PATCH, EXTENDED RELEASE TRANSDERMAL at 10:03

## 2022-07-22 RX ADMIN — AMLODIPINE BESYLATE SCH MG: 10 TABLET ORAL at 10:02

## 2022-07-22 RX ADMIN — INSULIN ASPART SCH: 100 INJECTION, SOLUTION INTRAVENOUS; SUBCUTANEOUS at 16:55

## 2022-07-22 RX ADMIN — METFORMIN HYDROCHLORIDE SCH MG: 500 TABLET ORAL at 06:34

## 2022-07-22 RX ADMIN — LEVETIRACETAM SCH MG: 500 TABLET, FILM COATED ORAL at 10:02

## 2022-07-22 RX ADMIN — LEVETIRACETAM SCH MG: 500 TABLET, FILM COATED ORAL at 21:19

## 2022-07-22 RX ADMIN — NICOTINE PRN MG: 4 INHALANT RESPIRATORY (INHALATION) at 18:36

## 2022-07-22 RX ADMIN — INSULIN DETEMIR SCH UNITS: 100 INJECTION, SOLUTION SUBCUTANEOUS at 21:19

## 2022-07-22 RX ADMIN — Medication SCH TAB: at 10:03

## 2022-07-22 RX ADMIN — INSULIN ASPART SCH: 100 INJECTION, SOLUTION INTRAVENOUS; SUBCUTANEOUS at 06:36

## 2022-07-22 RX ADMIN — INSULIN ASPART SCH: 100 INJECTION, SOLUTION INTRAVENOUS; SUBCUTANEOUS at 11:59

## 2022-07-22 RX ADMIN — ATORVASTATIN CALCIUM SCH MG: 10 TABLET, FILM COATED ORAL at 21:19

## 2022-07-23 RX ADMIN — NICOTINE PRN MG: 4 INHALANT RESPIRATORY (INHALATION) at 10:04

## 2022-07-23 RX ADMIN — Medication SCH MG: at 21:30

## 2022-07-23 RX ADMIN — METFORMIN HYDROCHLORIDE SCH MG: 500 TABLET ORAL at 06:38

## 2022-07-23 RX ADMIN — Medication SCH MG: at 21:29

## 2022-07-23 RX ADMIN — Medication SCH TAB: at 10:03

## 2022-07-23 RX ADMIN — INSULIN ASPART SCH: 100 INJECTION, SOLUTION INTRAVENOUS; SUBCUTANEOUS at 11:56

## 2022-07-23 RX ADMIN — LEVETIRACETAM SCH MG: 500 TABLET, FILM COATED ORAL at 10:03

## 2022-07-23 RX ADMIN — INSULIN ASPART SCH: 100 INJECTION, SOLUTION INTRAVENOUS; SUBCUTANEOUS at 06:38

## 2022-07-23 RX ADMIN — NICOTINE SCH MG: 14 PATCH, EXTENDED RELEASE TRANSDERMAL at 10:04

## 2022-07-23 RX ADMIN — INSULIN ASPART SCH: 100 INJECTION, SOLUTION INTRAVENOUS; SUBCUTANEOUS at 16:33

## 2022-07-23 RX ADMIN — METFORMIN HYDROCHLORIDE SCH MG: 500 TABLET ORAL at 16:52

## 2022-07-23 RX ADMIN — LEVETIRACETAM SCH MG: 500 TABLET, FILM COATED ORAL at 21:29

## 2022-07-23 RX ADMIN — AMLODIPINE BESYLATE SCH MG: 10 TABLET ORAL at 10:03

## 2022-07-23 RX ADMIN — ATORVASTATIN CALCIUM SCH MG: 10 TABLET, FILM COATED ORAL at 21:29

## 2022-07-23 RX ADMIN — INSULIN DETEMIR SCH UNITS: 100 INJECTION, SOLUTION SUBCUTANEOUS at 21:29

## 2022-07-24 RX ADMIN — INSULIN ASPART SCH: 100 INJECTION, SOLUTION INTRAVENOUS; SUBCUTANEOUS at 11:24

## 2022-07-24 RX ADMIN — Medication SCH TAB: at 09:36

## 2022-07-24 RX ADMIN — INSULIN ASPART SCH: 100 INJECTION, SOLUTION INTRAVENOUS; SUBCUTANEOUS at 16:55

## 2022-07-24 RX ADMIN — METFORMIN HYDROCHLORIDE SCH MG: 500 TABLET ORAL at 06:35

## 2022-07-24 RX ADMIN — METFORMIN HYDROCHLORIDE SCH MG: 500 TABLET ORAL at 16:54

## 2022-07-24 RX ADMIN — Medication SCH MG: at 21:05

## 2022-07-24 RX ADMIN — ATORVASTATIN CALCIUM SCH MG: 10 TABLET, FILM COATED ORAL at 21:05

## 2022-07-24 RX ADMIN — LEVETIRACETAM SCH MG: 500 TABLET, FILM COATED ORAL at 09:35

## 2022-07-24 RX ADMIN — INSULIN ASPART SCH: 100 INJECTION, SOLUTION INTRAVENOUS; SUBCUTANEOUS at 06:36

## 2022-07-24 RX ADMIN — AMLODIPINE BESYLATE SCH MG: 10 TABLET ORAL at 09:36

## 2022-07-24 RX ADMIN — INSULIN DETEMIR SCH UNITS: 100 INJECTION, SOLUTION SUBCUTANEOUS at 22:39

## 2022-07-24 RX ADMIN — LEVETIRACETAM SCH MG: 500 TABLET, FILM COATED ORAL at 21:05

## 2022-07-24 RX ADMIN — NICOTINE SCH MG: 14 PATCH, EXTENDED RELEASE TRANSDERMAL at 09:35

## 2022-07-25 RX ADMIN — LEVETIRACETAM SCH MG: 500 TABLET, FILM COATED ORAL at 21:19

## 2022-07-25 RX ADMIN — AMLODIPINE BESYLATE SCH MG: 10 TABLET ORAL at 15:14

## 2022-07-25 RX ADMIN — LEVETIRACETAM SCH MG: 500 TABLET, FILM COATED ORAL at 10:05

## 2022-07-25 RX ADMIN — Medication SCH TAB: at 10:06

## 2022-07-25 RX ADMIN — METFORMIN HYDROCHLORIDE SCH MG: 500 TABLET ORAL at 17:14

## 2022-07-25 RX ADMIN — ATORVASTATIN CALCIUM SCH MG: 10 TABLET, FILM COATED ORAL at 21:19

## 2022-07-25 RX ADMIN — Medication SCH MG: at 21:19

## 2022-07-25 RX ADMIN — INSULIN ASPART SCH UNITS: 100 INJECTION, SOLUTION INTRAVENOUS; SUBCUTANEOUS at 16:53

## 2022-07-25 RX ADMIN — NICOTINE SCH MG: 14 PATCH, EXTENDED RELEASE TRANSDERMAL at 10:06

## 2022-07-25 RX ADMIN — INSULIN DETEMIR SCH UNITS: 100 INJECTION, SOLUTION SUBCUTANEOUS at 21:20

## 2022-07-25 RX ADMIN — NICOTINE PRN MG: 4 INHALANT RESPIRATORY (INHALATION) at 12:59

## 2022-07-25 RX ADMIN — INSULIN ASPART SCH: 100 INJECTION, SOLUTION INTRAVENOUS; SUBCUTANEOUS at 06:22

## 2022-07-25 RX ADMIN — METFORMIN HYDROCHLORIDE SCH MG: 500 TABLET ORAL at 06:22

## 2022-07-26 RX ADMIN — ATORVASTATIN CALCIUM SCH MG: 10 TABLET, FILM COATED ORAL at 21:28

## 2022-07-26 RX ADMIN — Medication SCH MG: at 21:28

## 2022-07-26 RX ADMIN — INSULIN DETEMIR SCH UNITS: 100 INJECTION, SOLUTION SUBCUTANEOUS at 21:30

## 2022-07-26 RX ADMIN — METFORMIN HYDROCHLORIDE SCH MG: 500 TABLET ORAL at 17:23

## 2022-07-26 RX ADMIN — NICOTINE PRN MG: 4 INHALANT RESPIRATORY (INHALATION) at 06:30

## 2022-07-26 RX ADMIN — Medication SCH TAB: at 10:29

## 2022-07-26 RX ADMIN — AMLODIPINE BESYLATE SCH MG: 10 TABLET ORAL at 10:29

## 2022-07-26 RX ADMIN — LEVETIRACETAM SCH MG: 500 TABLET, FILM COATED ORAL at 10:29

## 2022-07-26 RX ADMIN — INSULIN ASPART SCH: 100 INJECTION, SOLUTION INTRAVENOUS; SUBCUTANEOUS at 06:33

## 2022-07-26 RX ADMIN — METFORMIN HYDROCHLORIDE SCH MG: 500 TABLET ORAL at 06:30

## 2022-07-26 RX ADMIN — INSULIN ASPART SCH: 100 INJECTION, SOLUTION INTRAVENOUS; SUBCUTANEOUS at 17:23

## 2022-07-26 RX ADMIN — LEVETIRACETAM SCH MG: 500 TABLET, FILM COATED ORAL at 21:28

## 2022-07-27 RX ADMIN — INSULIN DETEMIR SCH UNITS: 100 INJECTION, SOLUTION SUBCUTANEOUS at 21:30

## 2022-07-27 RX ADMIN — INSULIN ASPART SCH: 100 INJECTION, SOLUTION INTRAVENOUS; SUBCUTANEOUS at 16:47

## 2022-07-27 RX ADMIN — METFORMIN HYDROCHLORIDE SCH MG: 500 TABLET ORAL at 16:46

## 2022-07-27 RX ADMIN — Medication SCH MG: at 21:11

## 2022-07-27 RX ADMIN — AMLODIPINE BESYLATE SCH MG: 10 TABLET ORAL at 09:38

## 2022-07-27 RX ADMIN — ATORVASTATIN CALCIUM SCH MG: 10 TABLET, FILM COATED ORAL at 21:11

## 2022-07-27 RX ADMIN — Medication SCH TAB: at 09:38

## 2022-07-27 RX ADMIN — LEVETIRACETAM SCH MG: 500 TABLET, FILM COATED ORAL at 09:38

## 2022-07-27 RX ADMIN — METFORMIN HYDROCHLORIDE SCH MG: 500 TABLET ORAL at 06:44

## 2022-07-27 RX ADMIN — LEVETIRACETAM SCH MG: 500 TABLET, FILM COATED ORAL at 21:12

## 2022-07-27 RX ADMIN — INSULIN ASPART SCH: 100 INJECTION, SOLUTION INTRAVENOUS; SUBCUTANEOUS at 06:45

## 2022-07-28 RX ADMIN — Medication SCH MG: at 21:09

## 2022-07-28 RX ADMIN — METFORMIN HYDROCHLORIDE SCH MG: 500 TABLET ORAL at 16:57

## 2022-07-28 RX ADMIN — NICOTINE PRN MG: 4 INHALANT RESPIRATORY (INHALATION) at 16:59

## 2022-07-28 RX ADMIN — INSULIN ASPART SCH: 100 INJECTION, SOLUTION INTRAVENOUS; SUBCUTANEOUS at 16:58

## 2022-07-28 RX ADMIN — LEVETIRACETAM SCH MG: 500 TABLET, FILM COATED ORAL at 09:41

## 2022-07-28 RX ADMIN — AMLODIPINE BESYLATE SCH MG: 10 TABLET ORAL at 09:41

## 2022-07-28 RX ADMIN — INSULIN ASPART SCH: 100 INJECTION, SOLUTION INTRAVENOUS; SUBCUTANEOUS at 06:12

## 2022-07-28 RX ADMIN — Medication SCH TAB: at 09:41

## 2022-07-28 RX ADMIN — LEVETIRACETAM SCH MG: 500 TABLET, FILM COATED ORAL at 21:11

## 2022-07-28 RX ADMIN — ATORVASTATIN CALCIUM SCH MG: 10 TABLET, FILM COATED ORAL at 21:10

## 2022-07-28 RX ADMIN — METFORMIN HYDROCHLORIDE SCH MG: 500 TABLET ORAL at 06:11

## 2022-07-28 RX ADMIN — INSULIN DETEMIR SCH: 100 INJECTION, SOLUTION SUBCUTANEOUS at 21:44

## 2022-07-29 VITALS — SYSTOLIC BLOOD PRESSURE: 116 MMHG | DIASTOLIC BLOOD PRESSURE: 76 MMHG | HEART RATE: 83 BPM

## 2022-07-29 VITALS — TEMPERATURE: 96.8 F | RESPIRATION RATE: 17 BRPM

## 2022-07-29 RX ADMIN — LEVETIRACETAM SCH MG: 500 TABLET, FILM COATED ORAL at 09:11

## 2022-07-29 RX ADMIN — AMLODIPINE BESYLATE SCH MG: 10 TABLET ORAL at 09:12

## 2022-07-29 RX ADMIN — Medication SCH TAB: at 09:12

## 2022-07-29 RX ADMIN — METFORMIN HYDROCHLORIDE SCH MG: 500 TABLET ORAL at 06:18

## 2022-07-29 RX ADMIN — INSULIN ASPART SCH: 100 INJECTION, SOLUTION INTRAVENOUS; SUBCUTANEOUS at 06:19

## 2024-06-12 PROBLEM — Z00.00 ENCOUNTER FOR PREVENTIVE HEALTH EXAMINATION: Status: ACTIVE | Noted: 2024-06-12

## 2024-06-13 ENCOUNTER — APPOINTMENT (OUTPATIENT)
Dept: GASTROENTEROLOGY | Facility: CLINIC | Age: 74
End: 2024-06-13

## 2024-06-17 ENCOUNTER — LABORATORY RESULT (OUTPATIENT)
Age: 74
End: 2024-06-17

## 2024-06-17 ENCOUNTER — APPOINTMENT (OUTPATIENT)
Dept: GASTROENTEROLOGY | Facility: CLINIC | Age: 74
End: 2024-06-17
Payer: MEDICARE

## 2024-06-17 VITALS
DIASTOLIC BLOOD PRESSURE: 71 MMHG | WEIGHT: 131 LBS | SYSTOLIC BLOOD PRESSURE: 125 MMHG | OXYGEN SATURATION: 97 % | HEART RATE: 89 BPM | HEIGHT: 65 IN | BODY MASS INDEX: 21.83 KG/M2 | RESPIRATION RATE: 18 BRPM | TEMPERATURE: 98.1 F

## 2024-06-17 DIAGNOSIS — R63.4 ABNORMAL WEIGHT LOSS: ICD-10-CM

## 2024-06-17 DIAGNOSIS — R13.10 DYSPHAGIA, UNSPECIFIED: ICD-10-CM

## 2024-06-17 DIAGNOSIS — Z78.9 OTHER SPECIFIED HEALTH STATUS: ICD-10-CM

## 2024-06-17 DIAGNOSIS — F17.200 NICOTINE DEPENDENCE, UNSPECIFIED, UNCOMPLICATED: ICD-10-CM

## 2024-06-17 DIAGNOSIS — Z86.79 PERSONAL HISTORY OF OTHER DISEASES OF THE CIRCULATORY SYSTEM: ICD-10-CM

## 2024-06-17 PROBLEM — Z00.00 ENCOUNTER FOR PREVENTIVE HEALTH EXAMINATION: Status: ACTIVE | Noted: 2024-06-17

## 2024-06-17 PROCEDURE — 99205 OFFICE O/P NEW HI 60 MIN: CPT

## 2024-06-17 RX ORDER — OMEPRAZOLE 40 MG/1
40 CAPSULE, DELAYED RELEASE ORAL
Qty: 30 | Refills: 3 | Status: ACTIVE | COMMUNITY
Start: 2024-06-17 | End: 1900-01-01

## 2024-07-09 ENCOUNTER — APPOINTMENT (OUTPATIENT)
Dept: CT IMAGING | Facility: CLINIC | Age: 74
End: 2024-07-09